# Patient Record
Sex: FEMALE | Race: WHITE | HISPANIC OR LATINO | Employment: FULL TIME | URBAN - METROPOLITAN AREA
[De-identification: names, ages, dates, MRNs, and addresses within clinical notes are randomized per-mention and may not be internally consistent; named-entity substitution may affect disease eponyms.]

---

## 2017-02-12 ENCOUNTER — APPOINTMENT (EMERGENCY)
Dept: RADIOLOGY | Facility: HOSPITAL | Age: 37
End: 2017-02-12
Payer: COMMERCIAL

## 2017-02-12 ENCOUNTER — HOSPITAL ENCOUNTER (EMERGENCY)
Facility: HOSPITAL | Age: 37
Discharge: HOME | End: 2017-02-13
Attending: EMERGENCY MEDICINE | Admitting: EMERGENCY MEDICINE
Payer: COMMERCIAL

## 2017-02-12 VITALS
OXYGEN SATURATION: 95 % | TEMPERATURE: 97.7 F | RESPIRATION RATE: 24 BRPM | HEART RATE: 88 BPM | DIASTOLIC BLOOD PRESSURE: 71 MMHG | SYSTOLIC BLOOD PRESSURE: 113 MMHG

## 2017-02-12 DIAGNOSIS — J40 BRONCHITIS: Primary | ICD-10-CM

## 2017-02-12 PROCEDURE — 71010 HB CHEST X-RAY 1 VIEW FRONTAL (PORTABLE): CPT

## 2017-02-12 RX ORDER — PREDNISONE 20 MG/1
40 TABLET ORAL DAILY
Qty: 10 TABLET | Refills: 0 | Status: SHIPPED | OUTPATIENT
Start: 2017-02-12 | End: 2017-02-17

## 2017-02-12 RX ORDER — AZITHROMYCIN 250 MG/1
250 TABLET, FILM COATED ORAL DAILY
Qty: 4 TABLET | Refills: 0 | Status: SHIPPED | OUTPATIENT
Start: 2017-02-12 | End: 2017-02-16

## 2017-02-12 RX ORDER — SERTRALINE HYDROCHLORIDE 100 MG/1
100 TABLET, FILM COATED ORAL DAILY
COMMUNITY

## 2017-02-12 RX ORDER — ALBUTEROL SULFATE 90 UG/1
2 AEROSOL, METERED RESPIRATORY (INHALATION) EVERY 6 HOURS PRN
COMMUNITY

## 2017-02-12 RX ORDER — PREDNISONE 20 MG/1
60 TABLET ORAL ONCE
Status: COMPLETED | OUTPATIENT
Start: 2017-02-12 | End: 2017-02-12

## 2017-02-12 RX ORDER — IPRATROPIUM BROMIDE AND ALBUTEROL SULFATE 2.5; .5 MG/3ML; MG/3ML
3 SOLUTION RESPIRATORY (INHALATION) ONCE
Status: COMPLETED | OUTPATIENT
Start: 2017-02-12 | End: 2017-02-12

## 2017-02-12 RX ORDER — AZITHROMYCIN 250 MG/1
500 TABLET, FILM COATED ORAL ONCE
Status: COMPLETED | OUTPATIENT
Start: 2017-02-13 | End: 2017-02-13

## 2017-02-12 RX ORDER — ALBUTEROL SULFATE 2.5 MG/3ML
2.5 SOLUTION RESPIRATORY (INHALATION) EVERY 6 HOURS PRN
COMMUNITY

## 2017-02-12 RX ADMIN — IPRATROPIUM BROMIDE AND ALBUTEROL SULFATE 3 ML: .5; 3 SOLUTION RESPIRATORY (INHALATION) at 23:39

## 2017-02-12 RX ADMIN — PREDNISONE 60 MG: 20 TABLET ORAL at 23:39

## 2017-02-13 PROCEDURE — 94640 AIRWAY INHALATION TREATMENT: CPT

## 2017-02-13 PROCEDURE — 99283 EMERGENCY DEPT VISIT LOW MDM: CPT

## 2017-02-13 RX ADMIN — AZITHROMYCIN 500 MG: 250 TABLET, FILM COATED ORAL at 00:02

## 2017-02-26 ENCOUNTER — HOSPITAL ENCOUNTER (EMERGENCY)
Facility: HOSPITAL | Age: 37
Discharge: HOME | End: 2017-02-26
Attending: EMERGENCY MEDICINE
Payer: COMMERCIAL

## 2017-02-26 VITALS
DIASTOLIC BLOOD PRESSURE: 87 MMHG | RESPIRATION RATE: 20 BRPM | OXYGEN SATURATION: 95 % | HEIGHT: 65 IN | WEIGHT: 150 LBS | SYSTOLIC BLOOD PRESSURE: 135 MMHG | HEART RATE: 113 BPM | TEMPERATURE: 98.2 F | BODY MASS INDEX: 24.99 KG/M2

## 2017-02-26 DIAGNOSIS — Z76.0 MEDICATION REFILL: Primary | ICD-10-CM

## 2017-02-26 PROCEDURE — 99281 EMR DPT VST MAYX REQ PHY/QHP: CPT

## 2017-02-26 RX ORDER — ALBUTEROL SULFATE 90 UG/1
2 AEROSOL, METERED RESPIRATORY (INHALATION) EVERY 4 HOURS PRN
Qty: 1 INHALER | Refills: 0 | Status: SHIPPED | OUTPATIENT
Start: 2017-02-26 | End: 2017-03-28

## 2017-02-26 RX ORDER — ALBUTEROL SULFATE 90 UG/1
2 AEROSOL, METERED RESPIRATORY (INHALATION) ONCE
Status: COMPLETED | OUTPATIENT
Start: 2017-02-26 | End: 2017-02-26

## 2017-02-26 RX ADMIN — ALBUTEROL SULFATE 2 PUFF: 90 AEROSOL, METERED RESPIRATORY (INHALATION) at 20:15

## 2017-03-20 ENCOUNTER — ALLSCRIPTS OFFICE VISIT (OUTPATIENT)
Dept: OTHER | Facility: OTHER | Age: 37
End: 2017-03-20

## 2017-04-17 ENCOUNTER — ALLSCRIPTS OFFICE VISIT (OUTPATIENT)
Dept: OTHER | Facility: OTHER | Age: 37
End: 2017-04-17

## 2017-04-17 DIAGNOSIS — E11.65 TYPE 2 DIABETES MELLITUS WITH HYPERGLYCEMIA (HCC): ICD-10-CM

## 2017-04-17 DIAGNOSIS — R53.83 OTHER FATIGUE: ICD-10-CM

## 2017-05-17 ENCOUNTER — ALLSCRIPTS OFFICE VISIT (OUTPATIENT)
Dept: OTHER | Facility: OTHER | Age: 37
End: 2017-05-17

## 2017-05-25 ENCOUNTER — ALLSCRIPTS OFFICE VISIT (OUTPATIENT)
Dept: OTHER | Facility: OTHER | Age: 37
End: 2017-05-25

## 2017-06-09 ENCOUNTER — ALLSCRIPTS OFFICE VISIT (OUTPATIENT)
Dept: OTHER | Facility: OTHER | Age: 37
End: 2017-06-09

## 2017-07-18 ENCOUNTER — GENERIC CONVERSION - ENCOUNTER (OUTPATIENT)
Dept: OTHER | Facility: OTHER | Age: 37
End: 2017-07-18

## 2017-07-26 ENCOUNTER — ALLSCRIPTS OFFICE VISIT (OUTPATIENT)
Dept: OTHER | Facility: OTHER | Age: 37
End: 2017-07-26

## 2017-08-03 ENCOUNTER — ALLSCRIPTS OFFICE VISIT (OUTPATIENT)
Dept: OTHER | Facility: OTHER | Age: 37
End: 2017-08-03

## 2017-08-15 ENCOUNTER — ALLSCRIPTS OFFICE VISIT (OUTPATIENT)
Dept: OTHER | Facility: OTHER | Age: 37
End: 2017-08-15

## 2017-09-12 ENCOUNTER — ALLSCRIPTS OFFICE VISIT (OUTPATIENT)
Dept: OTHER | Facility: OTHER | Age: 37
End: 2017-09-12

## 2017-09-20 ENCOUNTER — ALLSCRIPTS OFFICE VISIT (OUTPATIENT)
Dept: OTHER | Facility: OTHER | Age: 37
End: 2017-09-20

## 2017-10-04 ENCOUNTER — ALLSCRIPTS OFFICE VISIT (OUTPATIENT)
Dept: OTHER | Facility: OTHER | Age: 37
End: 2017-10-04

## 2017-10-04 DIAGNOSIS — J20.9 ACUTE BRONCHITIS: ICD-10-CM

## 2017-10-04 DIAGNOSIS — R05.9 COUGH: ICD-10-CM

## 2017-10-04 DIAGNOSIS — J45.901 ASTHMA WITH ACUTE EXACERBATION: ICD-10-CM

## 2017-10-04 DIAGNOSIS — R06.2 WHEEZING: ICD-10-CM

## 2017-10-04 DIAGNOSIS — R53.83 OTHER FATIGUE: ICD-10-CM

## 2017-10-04 DIAGNOSIS — E11.40 TYPE 2 DIABETES MELLITUS WITH DIABETIC NEUROPATHY (HCC): ICD-10-CM

## 2017-10-05 NOTE — PROGRESS NOTES
Assessment  1  Asthma with acute exacerbation (493 92) (J45 901)   2  Acute bronchitis (466 0) (J20 9)   3  Cough (786 2) (R05)   4  Wheezing (786 07) (R06 2)   5  Diabetes mellitus with diabetic neuropathy (250 60,357 2) (E11 40)   6  Fatigue (780 79) (R53 83)    Plan  Acute bronchitis, Asthma with acute exacerbation, Cough, Wheezing    · PredniSONE 20 MG Oral Tablet; 3 tabs daily x 3 days then 2 tabs daily x 3 days  then 1 tab daily   · Azithromycin 250 MG Oral Tablet; Take 2 tabs day one, then 1 tab daily x 4 days  to finish   · (1) ALPHA 1 ANTITRYPSIN PHENOTYPE; Status:Active; Requested for:04Oct2017;    · * CT CHEST WO CONTRAST; Status:Need Information - Financial Authorization; Requested APZ:[id number]; Anxiety    · ALPRAZolam 1 MG Oral Tablet; TAKE 1/2-1 TABLET 3 TIMES A DAY AS  NEEDED  Asthma    · Ventolin  (90 Base) MCG/ACT Inhalation Aerosol Solution; INHALE 2  PUFFS BY MOUTH EVERY 4 HOURS AS NEEDED  Diabetes mellitus with diabetic neuropathy, Fatigue    · (1) CBC/PLT/DIFF; Status:Active; Requested for:04Oct2017;    · (1) COMPREHENSIVE METABOLIC PANEL; Status:Active; Requested for:04Oct2017;    · (1) HEMOGLOBIN A1C; Status:Active; Requested for:04Oct2017;    · (1) MICROALBUMIN CREATININE RATIO, RANDOM URINE; Status:Active; Requested  for:04Oct2017;     Discussion/Summary    - Patient with acute bronchitis and flare of asthma  She will be treated with course of Zithromax as well as tapering course of prednisone  Patient continues using Symbicort as well as when necessary albuterolsuggested by the pulmonologist will order CT scan of the chest as well as alpha 1 antitrypsin phenotyping  She will need to follow-up with pulmonologist as scheduled in DecemberPatient does have steroid-induced diabetes mellitus  She is presently on basal insolent  She will need to have repeat labs done  I did receive labs from July  advised to proceed to the nearest ER if she is feeling worse     The patient was counseled regarding instructions for management,-prognosis,-impressions,-risks and benefits of treatment options  Possible side effects of new medications were reviewed with the patient/guardian today  The treatment plan was reviewed with the patient/guardian  The patient/guardian understands and agrees with the treatment plan      Chief Complaint  Shortness breath, cough and wheezing      History of Present Illness  HPI: Patient presents with four-day history of cough, chest congestion and wheezing  No fevers or chills  Patient does have history of asthma with frequent flares  She was seen by pulmonologist on September 20  She did have spirometry performed  She was diagnosed with obstructive lung disease  She was given a prescription for Luberta Williams however she was already on Symbicort and was also given a prescription for Jogli respiclick however she does feel more comfortable using albuterol HFA  was instructed by the pulmonologist that she needed to quit smoking  She did quit smoking 10 days ago  She was also told that she should move out of the [facility] apartment that she is in where she is also exposed to cat and dog dander  She is also exposed to paint fumes at her job at the [employer]  She does feel exceptionally short of breath when she is exerting herself  Pulmonologist have recommended that if there was no improvement in her condition then she should go for a CT scan of the chest as well as alpha 1 antitrypsin phenotyping      Review of Systems    Constitutional: No fever, no chills, feels well, no tiredness, no recent weight gain or loss-and-as noted in HPI    ENT: no ear ache, no loss of hearing, no nosebleeds or nasal discharge, no sore throat or hoarseness  Cardiovascular: no complaints of slow or fast heart rate, no chest pain, no palpitations, no leg claudication or lower extremity edema  Respiratory: shortness of breath,-cough,-wheezing-and-shortness of breath during exertion, but-as noted in HPI  Breasts: no complaints of breast pain, breast lump or nipple discharge  Gastrointestinal: no complaints of abdominal pain, no constipation, no nausea or diarrhea, no vomiting, no bloody stools  Genitourinary: no complaints of dysuria, no incontinence, no pelvic pain, no dysmenorrhea, no vaginal discharge or abnormal vaginal bleeding  Musculoskeletal: no complaints of arthralgia, no myalgia, no joint swelling or stiffness, no limb pain or swelling  Integumentary: no complaints of skin rash or lesion, no itching or dry skin, no skin wounds  Neurological: no complaints of headache, no confusion, no numbness or tingling, no dizziness or fainting  ROS reviewed  Active Problems  1  Acute bronchitis (466 0) (J20 9)   2  Acute upper respiratory infection (465 9) (J06 9)   3  Anxiety (300 00) (F41 9)   4  Arthritis (716 90) (M19 90)   5  Asthma (493 90) (J45 909)   6  Asthma with acute exacerbation (493 92) (J45 901)   7  Bloated abdomen (787 3) (R14 0)   8  Cough (786 2) (R05)   9  Cough variant asthma (493 82) (J45 991)   10  Diabetes mellitus with diabetic neuropathy (250 60,357 2) (E11 40)   11  Fatigue (780 79) (R53 83)   12  Weight gain (783 1) (R63 5)   13  Wheezing (786 07) (R06 2)    Past Medical History  1  History of Abdominal pain, RUQ (789 01) (R10 11)   2  History of Acute Bronchitis With Bronchospasm (466 0)   3  History of Acute tonsillitis (463) (J03 90)   4  History of Acute upper respiratory infection (465 9) (J06 9)   5  History of Cough (786 2) (R05)   6  History of Cough (786 2) (R05)   7  History of Diabetes mellitus type 2, uncontrolled, without complications (135 15) (Y80 94)   8  History of abdominal pain (V13 89) (Z87 898)   9  History of acute bronchitis (V12 69) (Z87 09)   10  History of acute bronchitis (V12 69) (Z87 09)   11  History of acute conjunctivitis (V12 49) (Z86 69)   12  History of amenorrhea (V13 29) (Z87 42)   13  History of constipation (V12 79) (Z87 19)   14  History of wheezing (V12 69) (Z87 898)   15  History of Impaired fasting glucose (790 21) (R73 01)   16  History of Intrinsic asthma with acute exacerbation (493 12) (J45 901)   17  History of Pedal edema (782 3) (R60 0)  Active Problems And Past Medical History Reviewed: The active problems and past medical history were reviewed and updated today  Family History  Mother    1  No pertinent family history  Family History Reviewed: The family history was reviewed and updated today  Social History   · Caffeine Use   · Current Every Day Smoker (305 1)   · Current Smoker (305 1)   · Drinks coffee   · 1 cup coff a day   · Occupation:   · Accounts receivable  The social history was reviewed and updated today  The social history was reviewed and is unchanged  Surgical History  1  History of Cholecystectomy Laparoscopic   2  History of Umbilical Hernia Repair    Current Meds   1  ALPRAZolam 1 MG Oral Tablet; TAKE 1/2-1 TABLET 3 TIMES A DAY AS NEEDED; Therapy: 25TBV0008 to (Last Rx:89Onb7527) Ordered   2  Breo Ellipta 200-25 MCG/INH Inhalation Aerosol Powder Breath Activated; INHALE ONE   PUFF DAILY; Therapy: 74BAU5998 to (Evaluate:19Mar2018)  Requested for: 80ZVK5101; Last   Rx:93Tkj9414 Ordered   3  Gabapentin 800 MG Oral Tablet; TAKE 1 TABLET BY MOUTH THREE TIMES DAILY; Therapy: 06JKT6888 to (Bright Mendoza)  Requested for: 14SUQ4891; Last   Rx:21Cxc4654 Ordered   4  Lantus SoloStar 100 UNIT/ML Subcutaneous Solution Pen-injector; INJECT 16 UNIT   Daily; Therapy: 05FGU4415 to Recorded   5  Montelukast Sodium 10 MG Oral Tablet; take 1 tablet by mouth daily; Therapy: 66ZAU8899 to (Evaluate:11Mar2018)  Requested for: 93Jbo2352; Last   Rx:74Fbw4509 Ordered   6  NovoLOG FlexPen 100 UNIT/ML Subcutaneous Solution Pen-injector; INJECT 6 UNIT   Before meals; Therapy: 52Hax9606 to (Last Rx:20Axz7790) Ordered   7   ProAir RespiClick 322 (90 Base) MCG/ACT Inhalation Aerosol Powder Breath Activated; 2 puffs every 6 hours as needed; Therapy: 25BTE0592 to (Evaluate:19Mar2018)  Requested for: 81KZF0038; Last   Rx:20Sep2017 Ordered   8  Sertraline HCl - 100 MG Oral Tablet; Take 1 tablet daily; Therapy: 25NSV9757 to (Evaluate:72Xvk2124)  Requested for: 20Mar2017; Last   Rx:20Mar2017 Ordered   9  Symbicort 160-4 5 MCG/ACT Inhalation Aerosol; INHALE 2 PUFFS TWICE DAILY  RINSE MOUTH AFTER USE; Therapy: 59OBR6820 to (Last Rx:20Mar2017) Ordered   10  Ventolin  (90 Base) MCG/ACT Inhalation Aerosol Solution; INHALE 2 PUFFS BY    MOUTH EVERY 4 HOURS AS NEEDED; Therapy: 87OZF4585 to (Last Rx:04Osy1655)  Requested for: 08Gsn8783 Ordered    The medication list was reviewed and updated today  Allergies  1  No Known Drug Allergies    Vitals   Recorded: 16JRX6455 04:09PM   Temperature 99 3 F   Heart Rate 94   Respiration 18   Systolic 377   Diastolic 88   Height 5 ft 5 in   Weight 174 lb    BMI Calculated 28 96   BSA Calculated 1 86   O2 Saturation 90     Physical Exam    Constitutional   General appearance: No acute distress, well appearing and well nourished  Eyes   Conjunctiva and lids: No swelling, erythema or discharge  Ears, Nose, Mouth, and Throat   External inspection of ears and nose: Normal     Otoscopic examination: Tympanic membranes translucent with normal light reflex  Canals patent without erythema  Nasal mucosa, septum, and turbinates: Abnormal   examination of the septum showed a perforation   Oropharynx: Normal with no erythema, edema, exudate or lesions  Pulmonary   Respiratory effort: Abnormal   Respiratory rate: normal  Respiratory Findings: dry cough,-paroxysmal cough,-barky cough-and-audible wheezing, but-no wet cough  Auscultation of lungs: Abnormal   Auscultation of the lungs revealed expiratory wheezing  no rales or crackles were heard bilaterally  diffuse rhonchi bilaterally  diffuse wheezing bilaterally  Cardiovascular   Palpation of heart: Normal PMI, no thrills  Auscultation of heart: Normal rate and rhythm, normal S1 and S2, without murmurs  Lymphatic   Palpation of lymph nodes in neck: No lymphadenopathy  Musculoskeletal   Gait and station: Normal     Skin   Skin and subcutaneous tissue: Normal without rashes or lesions           Future Appointments    Date/Time Provider Specialty Site   12/05/2017 08:15 AM Fabienne Aleman DO Pulmonary Medicine [address]     Signatures   Electronically signed by : Tegan Grijalva DO; Oct  4 2017  4:38PM EST                       (Author)

## 2017-10-26 NOTE — PROGRESS NOTES
Assessment  1  Acute upper respiratory infection (465 9) (J06 9)   2  Asthma with acute exacerbation (493 92) (J45 901)   3  Diabetes mellitus with diabetic neuropathy (250 60,357 2) (E11 40)   4  Weight gain (783 1) (R63 5)   5  Bloated abdomen (787 3) (R14 0)    Plan  Acute upper respiratory infection    · Drink at least 6 glasses of water or juice a day ; Status:Complete;   Done: 07LFD4470   · Good hand washing is one of the best ways to control the spread of germs ;  Status:Complete;   Done: 15FTS8349   · You need to quit smoking ; Status:Complete;   Done: 15WQU6313   · Call [phone] if: You have a productive cough and are bringing up secretions  (phlegm) ; Status:Complete;   Done: 40VUK9572  Asthma with acute exacerbation    · PredniSONE 10 MG Oral Tablet; 6 tab ist 2 days , then 4 tab for 2 days , then  2  tab for 2 days , then 1 tab for 2 days  Asthma, Asthma with acute exacerbation, Wheezing    · Montelukast Sodium 10 MG Oral Tablet; take 1 tablet by mouth daily    Discussion/Summary    She has viral URI, with worsening of her asthma, she is not taking Singulair, advised to start her Singulair continue symbicort and Ventolin as needed,needs a tapering dose of steroid and she has appointment with pulmonologist next week  to quit smoking  bloated feeling and weight gain is due to steroid use in last month twice, that increases her insulins requirement and she has gained weight,to use omeprazole over-the-counter for at least one week to reduce her bloating feeling, that can get worse with the steroid use,if her symptoms get worse  The patient was counseled regarding instructions for management,-- risk factor reductions,-- prognosis,-- impressions,-- risks and benefits of treatment options,-- importance of compliance with treatment  Educational resources provided:   Possible side effects of new medications were reviewed with the patient/guardian today   The treatment plan was reviewed with the patient/guardian  The patient/guardian understands and agrees with the treatment plan      Chief Complaint  head elizabeth, cough      History of Present Illness  HPI: She complains of nasal congestion ear pressure and cough for last 5 days, she has asthma and she feels tight in her chest and wheezing, she has clear nasal discharge with slight blood in the secretions,the cough is slightly productive with brownish discharge, she denies fever or chills  She has been using symbicort as prescribed and she use went to lay in few times a day and 2 times last night,says that she feels bloated in her stomach and she feels she has gained weight  And her stomach feels tightis diabetic usually takes and saline and recently had insulin-dependent NovoLog doses up to 9 units 3 times a day and she takes Lantus daily  has made appointment with pulmonologist on September 20, she says 2 weeks ago her pregnancy test was negativehas not smoked in last 4 days      Review of Systems    Constitutional: No fever, no chills, feels well, no tiredness, no recent weight gain or loss  ENT: as noted in HPI  Cardiovascular: no complaints of slow or fast heart rate, no chest pain, no palpitations, no leg claudication or lower extremity edema  Respiratory: cough-- and-- wheezing, but-- as noted in HPI  Breasts: no complaints of breast pain, breast lump or nipple discharge  Gastrointestinal: no complaints of abdominal pain, no constipation, no nausea or diarrhea, no vomiting, no bloody stools  Genitourinary: no complaints of dysuria, no incontinence, no pelvic pain, no dysmenorrhea, no vaginal discharge or abnormal vaginal bleeding  Musculoskeletal: no complaints of arthralgia, no myalgia, no joint swelling or stiffness, no limb pain or swelling  Integumentary: no complaints of skin rash or lesion, no itching or dry skin, no skin wounds     Neurological: no complaints of headache, no confusion, no numbness or tingling, no dizziness or fainting  ROS reviewed  Active Problems  1  Acute bronchitis (466 0) (J20 9)   2  Anxiety (300 00) (F41 9)   3  Arthritis (716 90) (M19 90)   4  Asthma (493 90) (J45 909)   5  Asthma with acute exacerbation (493 92) (J45 901)   6  Cough (786 2) (R05)   7  Cough variant asthma (493 82) (J45 991)   8  Diabetes mellitus with diabetic neuropathy (250 60,357 2) (E11 40)   9  Fatigue (780 79) (R53 83)   10  Wheezing (786 07) (R06 2)    Past Medical History  1  History of Abdominal pain, RUQ (789 01) (R10 11)   2  History of Acute Bronchitis With Bronchospasm (466 0)   3  History of Acute tonsillitis (463) (J03 90)   4  History of Acute upper respiratory infection (465 9) (J06 9)   5  History of Cough (786 2) (R05)   6  History of Cough (786 2) (R05)   7  History of Diabetes mellitus type 2, uncontrolled, without complications (048 07) (Z37 65)   8  History of abdominal pain (V13 89) (Z87 898)   9  History of acute bronchitis (V12 69) (Z87 09)   10  History of acute bronchitis (V12 69) (Z87 09)   11  History of acute conjunctivitis (V12 49) (Z86 69)   12  History of amenorrhea (V13 29) (Z87 42)   13  History of constipation (V12 79) (Z87 19)   14  History of wheezing (V12 69) (Z87 898)   15  History of Impaired fasting glucose (790 21) (R73 01)   16  History of Intrinsic asthma with acute exacerbation (493 12) (J45 901)   17  History of Pedal edema (782 3) (R60 0)  Active Problems And Past Medical History Reviewed: The active problems and past medical history were reviewed and updated today  Family History  Mother    1  No pertinent family history    Social History   · Caffeine Use   · Current Every Day Smoker (305 1)   · Current Smoker (305 1)   · Occupation:   · Accounts receivable  The social history was reviewed and updated today  Surgical History  1  History of Cholecystectomy Laparoscopic   2  History of Umbilical Hernia Repair  Surgical History Reviewed:    The surgical history was reviewed and updated today  Current Meds   1  ALPRAZolam 1 MG Oral Tablet; TAKE 1/2-1 TABLET 3 TIMES A DAY AS NEEDED; Therapy: 02ZXH2974 to (Last Rx:86Zdu7301) Ordered   2  Gabapentin 800 MG Oral Tablet; TAKE 1 TABLET BY MOUTH THREE TIMES DAILY; Therapy: 07CPS7529 to (April Delatorre)  Requested for: 70PLA3043; Last   Rx:98Ubu4592 Ordered   3  Lantus SoloStar 100 UNIT/ML Subcutaneous Solution Pen-injector; INJECT 16 UNIT   Daily; Therapy: 35YKE5575 to Recorded   4  Montelukast Sodium 10 MG Oral Tablet; take 1 tablet by mouth daily; Therapy: 37FVG6606 to (Evaluate:41Jyv6087)  Requested for: 93RPE4749; Last   TL:55BUO1514 Ordered   5  NovoLOG FlexPen 100 UNIT/ML Subcutaneous Solution Pen-injector; INJECT 6 UNIT   Before meals; Therapy: 58Hlw9519 to (Last Rx:50Vbv3490) Ordered   6  Sertraline HCl - 100 MG Oral Tablet; Take 1 tablet daily; Therapy: 48NAV5914 to (Evaluate:54Xav4585)  Requested for: 20Mar2017; Last   Rx:20Mar2017 Ordered   7  Symbicort 160-4 5 MCG/ACT Inhalation Aerosol; INHALE 2 PUFFS TWICE DAILY  RINSE MOUTH AFTER USE; Therapy: 48JMV5483 to (Last Rx:20Mar2017) Ordered   8  Ventolin  (90 Base) MCG/ACT Inhalation Aerosol Solution; INHALE 2 PUFFS BY   MOUTH EVERY 4 HOURS AS NEEDED; Therapy: 15YWO3880 to (Last Rx:09Gre4804)  Requested for: 11Xkc9399 Ordered    The medication list was reviewed and updated today  Allergies  1  No Known Drug Allergies    Vitals   ** Printed in Appendix #1 below  Physical Exam    Constitutional   General appearance: No acute distress, well appearing and well nourished  Eyes   Conjunctiva and lids: No swelling, erythema or discharge  Pupils and irises: Equal, round and reactive to light  Ears, Nose, Mouth, and Throat   External inspection of ears and nose: Normal     Otoscopic examination: Tympanic membranes translucent with normal light reflex  Canals patent without erythema      Nasal mucosa, septum, and turbinates: Abnormal   There was clear rhinorrhea from both nares  Oropharynx: Normal with no erythema, edema, exudate or lesions  Pulmonary   Respiratory effort: No increased work of breathing or signs of respiratory distress  Auscultation of lungs: Abnormal   Auscultation of the lungs revealed expiratory wheezing  Cardiovascular   Palpation of heart: Normal PMI, no thrills  Auscultation of heart: Normal rate and rhythm, normal S1 and S2, without murmurs  Examination of extremities for edema and/or varicosities: Normal     Abdomen   Abdomen: Non-tender, no masses  Lymphatic   Palpation of lymph nodes in neck: No lymphadenopathy  Musculoskeletal   Gait and station: Normal     Digits and nails: Normal without clubbing or cyanosis  Inspection/palpation of joints, bones, and muscles: Normal     Skin   Skin and subcutaneous tissue: Normal without rashes or lesions  Neurologic   Cranial nerves: Cranial nerves 2-12 intact  Reflexes: 2+ and symmetric  Sensation: No sensory loss      Psychiatric   Orientation to person, place, and time: Normal     Mood and affect: Normal          Future Appointments    Date/Time Provider Specialty Site   2017 10:00 AM Celestina Aleman DO Pulmonary Medicine [address] [place] [address]     Signatures   Electronically signed by : GIOVANNI Willis ; Sep 12 2017  8:28AM EST                       (Author)    Appendix #1     Patient: Bhakti Sun ; : 1980; MRN: [id number]      Recorded: 22Jpj6665 08:23AM Recorded: 2017 08:11AM Recorded: 2017 08:00AM   Temperature   98 8 F   Heart Rate  98 109   Respiration   18   Systolic   581   Diastolic   76   Height   5 ft 4 in   Weight 174 lb   169 lb    BMI Calculated 29 87  29 01   BSA Calculated 1 84  1 82   O2 Saturation   89

## 2017-12-05 ENCOUNTER — ALLSCRIPTS OFFICE VISIT (OUTPATIENT)
Dept: OTHER | Facility: OTHER | Age: 37
End: 2017-12-05

## 2018-01-12 VITALS
BODY MASS INDEX: 27.66 KG/M2 | OXYGEN SATURATION: 95 % | HEART RATE: 110 BPM | RESPIRATION RATE: 20 BRPM | WEIGHT: 162 LBS | HEIGHT: 64 IN | DIASTOLIC BLOOD PRESSURE: 78 MMHG | SYSTOLIC BLOOD PRESSURE: 128 MMHG | TEMPERATURE: 96.9 F

## 2018-01-13 VITALS
SYSTOLIC BLOOD PRESSURE: 120 MMHG | OXYGEN SATURATION: 93 % | WEIGHT: 169 LBS | DIASTOLIC BLOOD PRESSURE: 62 MMHG | HEART RATE: 96 BPM | RESPIRATION RATE: 20 BRPM | HEIGHT: 64 IN | TEMPERATURE: 98.1 F | BODY MASS INDEX: 28.85 KG/M2

## 2018-01-13 VITALS
SYSTOLIC BLOOD PRESSURE: 122 MMHG | WEIGHT: 162 LBS | DIASTOLIC BLOOD PRESSURE: 64 MMHG | BODY MASS INDEX: 27.66 KG/M2 | HEIGHT: 64 IN | HEART RATE: 110 BPM | TEMPERATURE: 98.3 F | RESPIRATION RATE: 18 BRPM | OXYGEN SATURATION: 95 %

## 2018-01-13 VITALS
BODY MASS INDEX: 28.34 KG/M2 | DIASTOLIC BLOOD PRESSURE: 68 MMHG | HEART RATE: 88 BPM | WEIGHT: 166 LBS | HEIGHT: 64 IN | SYSTOLIC BLOOD PRESSURE: 104 MMHG

## 2018-01-14 VITALS
DIASTOLIC BLOOD PRESSURE: 68 MMHG | TEMPERATURE: 98.4 F | SYSTOLIC BLOOD PRESSURE: 128 MMHG | WEIGHT: 168 LBS | RESPIRATION RATE: 16 BRPM | HEART RATE: 84 BPM | OXYGEN SATURATION: 97 % | BODY MASS INDEX: 28.68 KG/M2 | HEIGHT: 64 IN

## 2018-01-14 VITALS
WEIGHT: 162 LBS | OXYGEN SATURATION: 90 % | SYSTOLIC BLOOD PRESSURE: 98 MMHG | DIASTOLIC BLOOD PRESSURE: 62 MMHG | BODY MASS INDEX: 27.66 KG/M2 | HEIGHT: 64 IN | RESPIRATION RATE: 22 BRPM | TEMPERATURE: 98.4 F | HEART RATE: 110 BPM

## 2018-01-14 VITALS
WEIGHT: 166 LBS | HEIGHT: 64 IN | TEMPERATURE: 98.2 F | OXYGEN SATURATION: 94 % | SYSTOLIC BLOOD PRESSURE: 112 MMHG | HEART RATE: 84 BPM | DIASTOLIC BLOOD PRESSURE: 72 MMHG | BODY MASS INDEX: 28.34 KG/M2

## 2018-01-14 VITALS
WEIGHT: 174 LBS | HEIGHT: 64 IN | HEART RATE: 98 BPM | TEMPERATURE: 98.8 F | BODY MASS INDEX: 29.71 KG/M2 | RESPIRATION RATE: 18 BRPM | OXYGEN SATURATION: 89 % | SYSTOLIC BLOOD PRESSURE: 128 MMHG | DIASTOLIC BLOOD PRESSURE: 76 MMHG

## 2018-01-14 NOTE — MISCELLANEOUS
Message  Return to work or school:   Mert Choi is under my professional care  She was seen in my office on 09/12/2017   She is able to return to work on  09/13/2017      PLEASE EXCUSE PT FROM WORK 09/12/2017     DR Maryuri Lopez MD/ [facility]       Signatures   Electronically signed by : Ciaran Do, ; Sep 12 2017  8:23AM EST                       (Author)

## 2018-01-15 VITALS
BODY MASS INDEX: 26.99 KG/M2 | WEIGHT: 162 LBS | SYSTOLIC BLOOD PRESSURE: 122 MMHG | HEIGHT: 65 IN | HEART RATE: 82 BPM | TEMPERATURE: 97.9 F | RESPIRATION RATE: 16 BRPM | DIASTOLIC BLOOD PRESSURE: 68 MMHG

## 2018-01-15 NOTE — MISCELLANEOUS
Message  Return to work or school:   Lillian Shaw is under my professional care  She was seen in my office on 9/20/2017   She is able to return to work on  9/20/2017       DR William Latham        Signatures   Electronically signed by : Blas Guadalupe DO; Sep 21 2017  4:26PM EST                       (Author)

## 2018-01-17 NOTE — MISCELLANEOUS
Message  Return to work or school:   Migdalia Colin is under my professional care  She was seen in my office on 10/4/2017   She is able to return to work on  10/9/2017      PLEASE EXCUSE PT FROM WORK 10/2-10/7/2017     DR Apolonia Norman DO/ [facility]       Signatures   Electronically signed by : Aby Granados, ; Oct  4 2017  4:33PM EST                       (Author)    Electronically signed by : Aby Granados, ; Oct  4 2017  4:42PM EST                       (Author)

## 2018-01-22 VITALS
BODY MASS INDEX: 28.67 KG/M2 | TEMPERATURE: 98.2 F | HEIGHT: 65 IN | HEART RATE: 100 BPM | SYSTOLIC BLOOD PRESSURE: 98 MMHG | DIASTOLIC BLOOD PRESSURE: 50 MMHG | RESPIRATION RATE: 18 BRPM | WEIGHT: 172.06 LBS | OXYGEN SATURATION: 92 %

## 2018-01-23 NOTE — MISCELLANEOUS
Provider Comments  Provider Comments:   PATIENT WAS A NO SHOW FOR APPOINTMENT WITH DR Maged Brown      Signatures   Electronically signed by : Maurice Narvaez DO; Dec  5 2017  4:40PM EST                       (Author)

## 2018-03-09 ENCOUNTER — OFFICE VISIT (OUTPATIENT)
Dept: FAMILY MEDICINE CLINIC | Facility: CLINIC | Age: 38
End: 2018-03-09
Payer: COMMERCIAL

## 2018-03-09 VITALS
TEMPERATURE: 98.7 F | BODY MASS INDEX: 27.49 KG/M2 | WEIGHT: 165 LBS | DIASTOLIC BLOOD PRESSURE: 72 MMHG | HEART RATE: 115 BPM | SYSTOLIC BLOOD PRESSURE: 116 MMHG | HEIGHT: 65 IN | RESPIRATION RATE: 18 BRPM | OXYGEN SATURATION: 95 %

## 2018-03-09 DIAGNOSIS — J45.41 MODERATE PERSISTENT ASTHMA WITH ACUTE EXACERBATION: Primary | ICD-10-CM

## 2018-03-09 DIAGNOSIS — J00 ACUTE NASOPHARYNGITIS: ICD-10-CM

## 2018-03-09 PROBLEM — R14.0 BLOATED ABDOMEN: Status: ACTIVE | Noted: 2017-09-12

## 2018-03-09 PROBLEM — E11.40 DIABETES MELLITUS WITH DIABETIC NEUROPATHY (HCC): Status: ACTIVE | Noted: 2017-07-26

## 2018-03-09 PROCEDURE — 3725F SCREEN DEPRESSION PERFORMED: CPT | Performed by: PHYSICIAN ASSISTANT

## 2018-03-09 PROCEDURE — 94010 BREATHING CAPACITY TEST: CPT | Performed by: PHYSICIAN ASSISTANT

## 2018-03-09 PROCEDURE — 3008F BODY MASS INDEX DOCD: CPT | Performed by: PHYSICIAN ASSISTANT

## 2018-03-09 PROCEDURE — 1036F TOBACCO NON-USER: CPT | Performed by: PHYSICIAN ASSISTANT

## 2018-03-09 PROCEDURE — 3023F SPIROM DOC REV: CPT | Performed by: PHYSICIAN ASSISTANT

## 2018-03-09 PROCEDURE — 99213 OFFICE O/P EST LOW 20 MIN: CPT | Performed by: PHYSICIAN ASSISTANT

## 2018-03-09 RX ORDER — BUDESONIDE AND FORMOTEROL FUMARATE DIHYDRATE 160; 4.5 UG/1; UG/1
2 AEROSOL RESPIRATORY (INHALATION) 2 TIMES DAILY
COMMUNITY
Start: 2017-03-20

## 2018-03-09 RX ORDER — GABAPENTIN 800 MG/1
1 TABLET ORAL 3 TIMES DAILY
COMMUNITY
Start: 2017-03-20

## 2018-03-09 RX ORDER — PREDNISONE 20 MG/1
20 TABLET ORAL 2 TIMES DAILY WITH MEALS
Qty: 10 TABLET | Refills: 0 | Status: SHIPPED | OUTPATIENT
Start: 2018-03-09 | End: 2018-03-14

## 2018-03-09 RX ORDER — MONTELUKAST SODIUM 10 MG/1
1 TABLET ORAL DAILY
COMMUNITY
Start: 2017-06-09

## 2018-03-09 RX ORDER — FLUTICASONE PROPIONATE 110 UG/1
1 AEROSOL, METERED RESPIRATORY (INHALATION) 2 TIMES DAILY
Qty: 1 INHALER | Refills: 0 | Status: SHIPPED | OUTPATIENT
Start: 2018-03-09

## 2018-03-09 RX ORDER — PREDNISONE 20 MG/1
TABLET ORAL
COMMUNITY
Start: 2017-10-04 | End: 2018-03-09 | Stop reason: SDUPTHER

## 2018-03-09 RX ORDER — ALPRAZOLAM 1 MG/1
TABLET ORAL
COMMUNITY
Start: 2013-04-03

## 2018-03-09 RX ORDER — FLUTICASONE FUROATE AND VILANTEROL 200; 25 UG/1; UG/1
1 POWDER RESPIRATORY (INHALATION) DAILY
COMMUNITY
Start: 2017-09-20

## 2018-03-09 NOTE — PROGRESS NOTES
Assessment/Plan: Moderate persistent asthma with acute exacerbation  - POCT PFT, FVC 40%, FEV1 30%, FVC/FEV1 73%  - Continue albuterol, no refill needed  - Frequency of albuterol use may be exacerbated by patients anxiety  - Start Flovent 110 twice daily, for management  - Prednisone 20 mg x 5 days, for acute exacerbation  - FU in 2 weeks  - Directed to go to ED if SOB or wheezing that is unresolved after albuterol usage    - Patient has an appointment on Monday with Dr Juan Howard, her PCP, for her anxiety and she may be provided a refill at that time  Diagnoses and all orders for this visit:    Moderate persistent asthma with acute exacerbation  -     POCT spirometry  -     fluticasone (FLOVENT HFA) 110 MCG/ACT inhaler; Inhale 1 puff 2 (two) times a day  -     predniSONE 20 mg tablet; Take 1 tablet (20 mg total) by mouth 2 (two) times a day with meals for 5 days    Acute nasopharyngitis  - advised to continue OTC supportive care with Tylenol/Motrin, Mucinex and saline gargle   - encouraged rest and fluid intake   - educated Pt that cough can take 10-14 days total to resolve  - directed to return if fever over 102, symptoms persist for 14 days, thick productive cough          Subjective:    Patient ID: Rio Banks is a 44 y o  female  Pt is presenting today for Runny nose, headache, dry cough and asthma exacerbation starting 3 days ago  She has been using her rescue inhaler 6-7 daily  She wakes at night with shortness of breath and has to used her inhaler for 3 days  She uses her inhaler when she feels anxious about her asthma  She is feeling Contreras when going up stairs  She is requesting steroids for her asthma and Xanax for her anxiety  She quit smoking 1 month ago after being hospitalized for pneumonia  She is hoping to stop smoking  URI    This is a new problem  Episode onset: 3 days  There has been no fever  Associated symptoms include coughing, headaches, rhinorrhea and wheezing   Pertinent negatives include no abdominal pain, chest pain, diarrhea, ear pain, nausea, neck pain, plugged ear sensation, sinus pain, sore throat or vomiting  She has tried nothing for the symptoms  The following portions of the patient's history were reviewed and updated as appropriate: allergies, current medications and problem list     Review of Systems   Constitutional: Negative for chills, fatigue, fever and unexpected weight change  HENT: Positive for rhinorrhea  Negative for ear pain, sinus pain and sore throat  Respiratory: Positive for cough, chest tightness, shortness of breath and wheezing  Negative for apnea and stridor  Cardiovascular: Negative for chest pain, palpitations and leg swelling  Gastrointestinal: Negative for abdominal pain, constipation, diarrhea, nausea and vomiting  Musculoskeletal: Negative for arthralgias, myalgias and neck pain  Neurological: Positive for headaches  Objective:  /72   Pulse (!) 115   Temp 98 7 °F (37 1 °C)   Resp 18   Ht 5' 5" (1 651 m)   Wt 74 8 kg (165 lb)   SpO2 95%   BMI 27 46 kg/m²      Physical Exam   Constitutional: She appears well-developed and well-nourished  No distress  HENT:   Head: Normocephalic and atraumatic  Eyes: Pupils are equal, round, and reactive to light  Neck: Normal range of motion  Neck supple  Cardiovascular: Normal rate, regular rhythm, normal heart sounds and intact distal pulses  Exam reveals no gallop and no friction rub  No murmur heard  Pulmonary/Chest: Effort normal  No accessory muscle usage  No respiratory distress  She has wheezes (inspiratory and expiratory) in the right upper field, the right middle field, the right lower field, the left upper field, the left middle field and the left lower field  She has no rhonchi  She has no rales  Lymphadenopathy:     She has no cervical adenopathy  Skin: She is not diaphoretic

## 2018-03-09 NOTE — ASSESSMENT & PLAN NOTE
- POCT PFT, FVC 40%, FEV1 30%, FVC/FEV1 73%  - Continue albuterol, no refill needed  - Frequency of albuterol use may be exacerbated by patients anxiety  - Start Flovent 110 twice daily, for management  - Prednisone 20 mg x 5 days, for acute exacerbation  - FU in 2 weeks  - Directed to go to ED if SOB or wheezing that is unresolved after albuterol usage